# Patient Record
Sex: FEMALE | Race: ASIAN | NOT HISPANIC OR LATINO | Employment: STUDENT | URBAN - METROPOLITAN AREA
[De-identification: names, ages, dates, MRNs, and addresses within clinical notes are randomized per-mention and may not be internally consistent; named-entity substitution may affect disease eponyms.]

---

## 2019-02-13 ENCOUNTER — OFFICE VISIT (OUTPATIENT)
Dept: URGENT CARE | Facility: CLINIC | Age: 24
End: 2019-02-13

## 2019-02-13 VITALS
TEMPERATURE: 98 F | RESPIRATION RATE: 18 BRPM | HEART RATE: 76 BPM | DIASTOLIC BLOOD PRESSURE: 86 MMHG | OXYGEN SATURATION: 98 % | SYSTOLIC BLOOD PRESSURE: 128 MMHG

## 2019-02-13 DIAGNOSIS — R31.9 URINARY TRACT INFECTION WITH HEMATURIA, SITE UNSPECIFIED: Primary | ICD-10-CM

## 2019-02-13 DIAGNOSIS — N39.0 URINARY TRACT INFECTION WITH HEMATURIA, SITE UNSPECIFIED: Primary | ICD-10-CM

## 2019-02-13 LAB
B-HCG UR QL: NEGATIVE
BILIRUB UR QL STRIP: NEGATIVE
CTP QC/QA: YES
GLUCOSE UR QL STRIP: NEGATIVE
KETONES UR QL STRIP: NEGATIVE
LEUKOCYTE ESTERASE UR QL STRIP: POSITIVE
PH, POC UA: 7 (ref 5–8)
POC BLOOD, URINE: POSITIVE
POC NITRATES, URINE: NEGATIVE
PROT UR QL STRIP: POSITIVE
SP GR UR STRIP: 1.01 (ref 1–1.03)
UROBILINOGEN UR STRIP-ACNC: ABNORMAL (ref 0.1–1.1)

## 2019-02-13 PROCEDURE — 99203 OFFICE O/P NEW LOW 30 MIN: CPT | Mod: 25,S$GLB,, | Performed by: NURSE PRACTITIONER

## 2019-02-13 PROCEDURE — 99203 PR OFFICE/OUTPT VISIT, NEW, LEVL III, 30-44 MIN: ICD-10-PCS | Mod: 25,S$GLB,, | Performed by: NURSE PRACTITIONER

## 2019-02-13 PROCEDURE — 81025 POCT URINE PREGNANCY: ICD-10-PCS | Mod: S$GLB,,, | Performed by: NURSE PRACTITIONER

## 2019-02-13 PROCEDURE — 81003 URINALYSIS AUTO W/O SCOPE: CPT | Mod: QW,S$GLB,, | Performed by: NURSE PRACTITIONER

## 2019-02-13 PROCEDURE — 81003 POCT URINALYSIS, DIPSTICK, AUTOMATED, W/O SCOPE: ICD-10-PCS | Mod: QW,S$GLB,, | Performed by: NURSE PRACTITIONER

## 2019-02-13 PROCEDURE — 81025 URINE PREGNANCY TEST: CPT | Mod: S$GLB,,, | Performed by: NURSE PRACTITIONER

## 2019-02-13 RX ORDER — NORETHINDRONE ACETATE AND ETHINYL ESTRADIOL 1; 5 MG/1; UG/1
TABLET ORAL DAILY
COMMUNITY

## 2019-02-13 RX ORDER — CEPHALEXIN 500 MG/1
500 CAPSULE ORAL EVERY 12 HOURS
Qty: 10 CAPSULE | Refills: 0 | Status: SHIPPED | OUTPATIENT
Start: 2019-02-13 | End: 2019-02-18

## 2019-02-13 RX ORDER — IBUPROFEN 600 MG/1
600 TABLET ORAL EVERY 6 HOURS PRN
Qty: 20 TABLET | Refills: 0 | Status: SHIPPED | OUTPATIENT
Start: 2019-02-13 | End: 2019-02-18

## 2019-02-13 NOTE — PROGRESS NOTES
Subjective:       Patient ID: Megan Cerda is a 24 y.o. female.    Vitals:  temperature is 98.2 °F (36.8 °C). Her blood pressure is 128/86 and her pulse is 76. Her respiration is 18 and oxygen saturation is 98%.     Chief Complaint: Dysuria    Dysuria, hematuria, frequency, and urgency that started yesterday       Dysuria    This is a new problem. The current episode started in the past 7 days. The problem occurs every urination. The problem has been gradually worsening. The quality of the pain is described as burning. There has been no fever. She is sexually active. There is no history of pyelonephritis. Associated symptoms include chills, frequency, hematuria and urgency. Pertinent negatives include no nausea, vomiting or rash. She has tried nothing for the symptoms.       Constitution: Positive for chills. Negative for fever.   Neck: Negative for painful lymph nodes.   Gastrointestinal: Negative for abdominal pain, nausea and vomiting.   Genitourinary: Positive for dysuria, frequency, urgency and hematuria. Negative for urine decreased, history of kidney stones, painful menstruation, irregular menstruation, missed menses, heavy menstrual bleeding, ovarian cysts, genital trauma, vaginal pain, vaginal discharge, vaginal bleeding, vaginal odor, painful intercourse, genital sore, painful ejaculation and pelvic pain.   Musculoskeletal: Negative for back pain.   Skin: Negative for rash and lesion.   Hematologic/Lymphatic: Negative for swollen lymph nodes.       Objective:      Physical Exam   Constitutional: She is oriented to person, place, and time. Vital signs are normal. She appears well-developed and well-nourished. She is active and cooperative. No distress.   HENT:   Head: Normocephalic and atraumatic.   Nose: Nose normal.   Mouth/Throat: Oropharynx is clear and moist and mucous membranes are normal.   Eyes: Conjunctivae and lids are normal.   Neck: Trachea normal, normal range of motion, full passive  range of motion without pain and phonation normal. Neck supple.   Cardiovascular: Normal rate, regular rhythm, normal heart sounds, intact distal pulses and normal pulses.   Pulses:       Carotid pulses are 2+ on the right side, and 2+ on the left side.       Radial pulses are 2+ on the right side, and 2+ on the left side.   Pulmonary/Chest: Effort normal and breath sounds normal.   Abdominal: Soft. Normal appearance and bowel sounds are normal. She exhibits no abdominal bruit, no pulsatile midline mass and no mass. There is no rigidity, no rebound, no guarding, no CVA tenderness, no tenderness at McBurney's point and negative Hoffman's sign.   Musculoskeletal: She exhibits no edema or deformity.   Neurological: She is alert and oriented to person, place, and time. She has normal strength and normal reflexes. No sensory deficit.   Skin: Skin is warm, dry and intact. Capillary refill takes less than 2 seconds. No rash noted. She is not diaphoretic.   Psychiatric: She has a normal mood and affect. Her speech is normal and behavior is normal. Judgment and thought content normal. Cognition and memory are normal.   Nursing note and vitals reviewed.      Assessment:       1. Urinary tract infection with hematuria, site unspecified        Results for orders placed or performed in visit on 02/13/19   POCT Urinalysis, Dipstick, Automated, W/O Scope   Result Value Ref Range    POC Blood, Urine Positive (A) Negative    POC Bilirubin, Urine Negative Negative    POC Urobilinogen, Urine norm 0.1 - 1.1    POC Ketones, Urine Negative Negative    POC Protein, Urine Positive (A) Negative    POC Nitrates, Urine Negative Negative    POC Glucose, Urine Negative Negative    pH, UA 7.0 5 - 8    POC Specific Gravity, Urine 1.010 1.003 - 1.029    POC Leukocytes, Urine Positive (A) Negative   POCT urine pregnancy   Result Value Ref Range    POC Preg Test, Ur Negative Negative     Acceptable Yes        Plan:         Urinary  tract infection with hematuria, site unspecified  -     POCT Urinalysis, Dipstick, Automated, W/O Scope  -     POCT urine pregnancy  -     cephALEXin (KEFLEX) 500 MG capsule; Take 1 capsule (500 mg total) by mouth every 12 (twelve) hours. for 5 days  Dispense: 10 capsule; Refill: 0  -     ibuprofen (ADVIL,MOTRIN) 600 MG tablet; Take 1 tablet (600 mg total) by mouth every 6 (six) hours as needed for Pain.  Dispense: 20 tablet; Refill: 0      Patient Instructions   Please follow up with your primary care provider within 2-5 days if your signs and symptoms have not resolved or worsen.     If your condition worsens or fails to improve we recommend that you receive another evaluation at the emergency room immediately or contact your primary medical clinic to discuss your concerns.   You must understand that you have received an Urgent Care treatment only and that you may be released before all of your medical problems are known or treated. You, the patient, will arrange for follow up care as instructed.           Infections urinaires (urinary tract infections) chez les femmes  Les infections urinaires (IU) sont le plus souvent causées par shirin bactéries (bacteria) (germes (germs)). Tripp batcéries pénètrent les voies urinaires. Elles peuvent provenir de l'extérieur du corps. Ou loren, elles peuvent se déplacer de la peau de l'extérieur du rectum ou du vagin pour entrer dans l'urêtre. L'anatomie féminine facilite la pénétration shirin bactéries de l'estomac dans les voies urinaires, ce stefan est la source la plus répandue d'IU. Darcy signifie que les femmes développent shirin IU plus souvent que les hommes. Un symptôme fréquent shiirn IU est une douleur dans ou autour shirin voies urinaires. Néanmoins, la seule façon de vous assurer que vous avez une IU est de demander à votre médecin d'analyser votre urine. Les deux analyses pouvant être réalisées sont l'analyse d'urine (urinalysis) et la culture d'urine (urine culture).  Trois types  d'IU  · Cystite (cystisis) : L'infection de la vessie (cystite) est l'IU la plus répandue chez les femmes. Kira peut s'accompagner de mictions (urination) urgentes ou fréquentes. Vous pouvez aussi ressentir shirin douleurs et brûlures quand vous urinez, et trouver du sang dans votre urine.  · Urétrite (urethritis) : Il s'agit d'une inflammation de l'urêtre, c'est-à-dire le tube stefan transporte l'urine de la vessie vers l'extérieur du corps. Vous pouvez aussi avoir mal ou dos ou dans le bas-ventre. L'urétrite peut s'accompagner de mictions (urination) urgentes ou fréquentes.  · Pyélonéphrite (Pyelonephritis) : Il s'agit d'une infection rénale. Si kira n'est pas traitée, kira peut devenir sérieuse et endommager lisa reins. Dans les lacey les plus graves, vous pouvez être hospitalisé. Vous pouvez avoir de la fièvre et une douleur dans le haut du dos.  Médicaments de traitement shirin IU  La plupart shirin IU est traitée à l'aide d'antibiotiques. Ceux-ci tuent les bactéries. La durée du traitement dépend du type de l'infection. Le traitement peut ne durer que 3 jours. Si vous avez shirin IU à maintes reprises, vous devrez peut-être prendre un antibiotique de faible dose kimberlee plusieurs mois. Prenez les antibiotiques en suivant scrupuleusement les instructions. Prenez-les jusqu'à ce qu'il n'y en ait plus. Si vous arrêtez de les prendre trop tôt, l'infection peut presister et vous pouvez développer une résistance à cet antibiotique. Darcy peut rendre le traitement beaucoup plus difficile.  Changement du mode de vie pour traiter et empêcher les IU  Les changements de mode de vie ci-dessous vous aideront à vous débarrasser de votre IU. Ils peuvent aussi empêcher shirin IU futures.  · Buvez en grandes quantités. Notamment de l'eau, shirin jus et autres boissons sans caféine. Les liquides vous aident à éliminer les bactéries dans votre corps.  · Videz votre vessie. Videz toujours votre vessie quand vous sentez le besoin d'uriner. Et urinez toujours  delmy de dormir. L'urine stefan reste dans votre vessie peut entraîner une infection. De même, essayez d'uriner delmy et après les relations sexuelles.  · Maintenez une bonne hygiène personnelle. Essuyez-vous de l'delmy à l'arrière après avoir utiliser les toilettes. Darcy aide à empêcher les bactéries d'entrer dans l'urêtre.  · Utilisez shirin préservatifs pendant lisa relations sexuelles. Ils aident à éviter les IU causées par shirin bactéries sexuellement transmissibles. Évitez aussi d'utiliser shirin spermicides pendant lisa relations. Ils peuvent augmenter le risque d'IU. Choisissez d'autres formes de contraceptifs. Pour les femmes stefan ont tendance à développer shirin IU après les relations sexuelles, un antibiotique de faible dose préventif peut être cristiano. Assurez-vous de discuter de cette possibilité avec votre fournisseur de soins de santé.  · Réalisez le suivi avec votre fournisseur de soins de santé comme indiqué. Il ou kira peut vous prescrire shirin analyses pour vous assurer que l'infection a été éliminée. Au besoin, d'autres traitiements peuvent vous être prescrits.  Date Last Reviewed: 9/8/2014 © 2000-2017 [employer]. [address] [place] [address], [place] [address]. All rights reserved. This information is not intended as a substitute for professional medical care. Always follow your healthcare professional's instructions.

## 2019-02-13 NOTE — PATIENT INSTRUCTIONS
Please follow up with your primary care provider within 2-5 days if your signs and symptoms have not resolved or worsen.     If your condition worsens or fails to improve we recommend that you receive another evaluation at the emergency room immediately or contact your primary medical clinic to discuss your concerns.   You must understand that you have received an Urgent Care treatment only and that you may be released before all of your medical problems are known or treated. You, the patient, will arrange for follow up care as instructed.           Infections urinaires (urinary tract infections) chez les femmes  Les infections urinaires (IU) sont le plus souvent causées par shirin bactéries (bacteria) (germes (germs)). Tripp batcéries pénètrent les voies urinaires. Elles peuvent provenir de l'extérieur du corps. Ou loren, elles peuvent se déplacer de la peau de l'extérieur du rectum ou du vagin pour entrer dans l'urêtre. L'anatomie féminine facilite la pénétration shirin bactéries de l'estomac dans les voies urinaires, ce stefan est la source la plus répandue d'IU. Darcy signifie que les femmes développent shirin IU plus souvent que les hommes. Un symptôme fréquent shirin IU est une douleur dans ou autour shirin voies urinaires. Néanmoins, la seule façon de vous assurer que vous avez une IU est de demander à votre médecin d'analyser votre urine. Les deux analyses pouvant être réalisées sont l'analyse d'urine (urinalysis) et la culture d'urine (urine culture).  Trois types d'IU  · Cystite (cystisis) : L'infection de la vessie (cystite) est l'IU la plus répandue chez les femmes. Chantal peut s'accompagner de mictions (urination) urgentes ou fréquentes. Vous pouvez aussi ressentir shirin douleurs et brûlures quand vous urinez, et trouver du sang dans votre urine.  · Urétrite (urethritis) : Il s'agit d'une inflammation de l'urêtre, c'est-à-dire le tube stefan transporte l'urine de la vessie vers l'extérieur du corps. Vous pouvez aussi avoir mal ou  dos ou dans le bas-ventre. L'urétrite peut s'accompagner de mictions (urination) urgentes ou fréquentes.  · Pyélonéphrite (Pyelonephritis) : Il s'agit d'une infection rénale. Si kira n'est pas traitée, kira peut devenir sérieuse et endommager lisa reins. Dans les lacey les plus graves, vous pouvez être hospitalisé. Vous pouvez avoir de la fièvre et une douleur dans le haut du dos.  Médicaments de traitement shirin IU  La plupart shirin IU est traitée à l'aide d'antibiotiques. Ceux-ci tuent les bactéries. La durée du traitement dépend du type de l'infection. Le traitement peut ne durer que 3 jours. Si vous avez shirin IU à maintes reprises, vous devrez peut-être prendre un antibiotique de faible dose kimberlee plusieurs mois. Prenez les antibiotiques en suivant scrupuleusement les instructions. Prenez-les jusqu'à ce qu'il n'y en ait plus. Si vous arrêtez de les prendre trop tôt, l'infection peut presister et vous pouvez développer une résistance à cet antibiotique. Darcy peut rendre le traitement beaucoup plus difficile.  Changement du mode de vie pour traiter et empêcher les IU  Les changements de mode de vie ci-dessous vous aideront à vous débarrasser de votre IU. Ils peuvent aussi empêcher shirin IU futures.  · Buvez en grandes quantités. Notamment de l'eau, shirin jus et autres boissons sans caféine. Les liquides vous aident à éliminer les bactéries dans votre corps.  · Videz votre vessie. Videz toujours votre vessie quand vous sentez le besoin d'uriner. Et urinez toujours delmy de dormir. L'urine stefan reste dans votre vessie peut entraîner une infection. De même, essayez d'uriner delmy et après les relations sexuelles.  · Maintenez une bonne hygiène personnelle. Essuyez-vous de l'delmy à l'arrière après avoir utiliser les toilettes. Darcy aide à empêcher les bactéries d'entrer dans l'urêtre.  · Utilisez shirin préservatifs pendant lisa relations sexuelles. Ils aident à éviter les IU causées par shirin bactéries sexuellement transmissibles.  Évitez aussi d'utiliser shirin spermicides pendant lisa relations. Ils peuvent augmenter le risque d'IU. Choisissez d'autres formes de contraceptifs. Pour les femmes stefan ont tendance à développer shirin IU après les relations sexuelles, un antibiotique de faible dose préventif peut être cristiano. Assurez-vous de discuter de cette possibilité avec votre fournisseur de soins de santé.  · Réalisez le suivi avec votre fournisseur de soins de santé comme indiqué. Il ou kira peut vous prescrire shirin analyses pour vous assurer que l'infection a été éliminée. Au besoin, d'autres traitiements peuvent vous être prescrits.  Date Last Reviewed: 9/8/2014  © 0166-2634 [employer]. [address] [place] [address], [place] [address]. All rights reserved. This information is not intended as a substitute for professional medical care. Always follow your healthcare professional's instructions.